# Patient Record
Sex: MALE | Race: WHITE | Employment: UNEMPLOYED | URBAN - METROPOLITAN AREA
[De-identification: names, ages, dates, MRNs, and addresses within clinical notes are randomized per-mention and may not be internally consistent; named-entity substitution may affect disease eponyms.]

---

## 2018-01-10 ENCOUNTER — OFFICE VISIT (OUTPATIENT)
Dept: NEUROLOGY | Age: 23
End: 2018-01-10

## 2018-01-10 VITALS
RESPIRATION RATE: 16 BRPM | OXYGEN SATURATION: 98 % | TEMPERATURE: 97.7 F | HEIGHT: 64 IN | BODY MASS INDEX: 29.16 KG/M2 | WEIGHT: 170.8 LBS | DIASTOLIC BLOOD PRESSURE: 78 MMHG | SYSTOLIC BLOOD PRESSURE: 100 MMHG | HEART RATE: 113 BPM

## 2018-01-10 DIAGNOSIS — R25.9 ABNORMAL INVOLUNTARY MOVEMENT: Primary | ICD-10-CM

## 2018-01-10 RX ORDER — DIPHENHYDRAMINE HCL 25 MG
50 CAPSULE ORAL 2 TIMES DAILY
COMMUNITY
End: 2022-04-19

## 2018-01-10 RX ORDER — MAGNESIUM 200 MG
1000 TABLET ORAL DAILY
COMMUNITY
End: 2020-06-20

## 2018-01-10 RX ORDER — CYCLOBENZAPRINE HCL 10 MG
TABLET ORAL
COMMUNITY
End: 2022-03-16

## 2018-01-10 RX ORDER — PEDIATRIC MULTIVITAMIN NO.17
1 TABLET,CHEWABLE ORAL DAILY
COMMUNITY
End: 2021-04-13

## 2018-01-10 NOTE — PROGRESS NOTES
Chief Complaint   Patient presents with   [address] [place] [address]     NP: Movement Disorder. 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 Patient has history of autism diagnosed 18 years ago. Patient placed in room 6. Chart and medications reviewed with patient.

## 2018-01-10 NOTE — MR AVS SNAPSHOT
Visit Information Date & Time Provider Department Dept. Phone Encounter #  
 1/10/2018  1:45 PM Pavel Joshi MD [address] [place] [address] [phone] [id number] Follow-up Instructions Return if symptoms worsen or fail to improve, for we will call with blood result. Allergies as of 1/10/2018  Never Reviewed No Known Allergies Current Immunizations  Never Reviewed No immunizations on file. Not reviewed this visit You Were Diagnosed With   
  
 Codes Comments Abnormal involuntary movement    -  Primary ICD-10-CM: R25.9 ICD-9-CM: 083. 0 Vitals BP Pulse Temp Resp Height(growth percentile) Weight(growth percentile) 100/78 (!) 113 97.7 °F (36.5 °C) (Temporal) 16 5' 4\" (1.626 m) 170 lb 12.8 oz (77.5 kg) SpO2 BMI Smoking Status 98% 29.32 kg/m2 Never Smoker BMI and BSA Data Body Mass Index Body Surface Area  
 29.32 kg/m 2 1.87 m 2 Your Updated Medication List  
  
   
This list is accurate as of: 1/10/18  1:56 PM.  Always use your most recent med list.  
  
  
  
  
 BENADRYL 25 mg capsule Generic drug:  diphenhydrAMINE Take 25 mg by mouth every six (6) hours as needed. cyclobenzaprine 10 mg tablet Commonly known as:  FLEXERIL Take  by mouth three (3) times daily as needed for Muscle Spasm(s). pediatric multivitamins chewable tablet Take 1 Tab by mouth daily. VITAMIN B-12 1,000 mcg sublingual tablet Generic drug:  cyanocobalamin Take 1,000 mcg by mouth daily. Follow-up Instructions Return if symptoms worsen or fail to improve, for we will call with blood result. To-Do List   
 01/10/2018 Lab:  CERULOPLASMIN   
  
 01/10/2018 Lab:  [facility]! Ruslan Garrison introduces [facility] patient portal. Now you can access parts of your medical record, email your doctor's office, and request medication refills online. 1. In your internet browser, go to [email or web address] 2. Click on the First Time User? Click Here link in the Sign In box. You will see the New Member Sign Up page. 3. Enter your [facility] Access Code exactly as it appears below. You will not need to use this code after youve completed the sign-up process. If you do not sign up before the expiration date, you must request a new code. · [employer] [facility] Access Code: [email or web address] Expires: 4/10/2018 12:31 PM 
 
4. Enter the last four digits of your Social Security Number (xxxx) and Date of Birth (mm/dd/yyyy) as indicated and click Submit. You will be taken to the next sign-up page. 5. Create a [employer] ID. This will be your [facility] login ID and cannot be changed, so think of one that is secure and easy to remember. 6. Create a [facility] password. You can change your password at any time. 7. Enter your Password Reset Question and Answer. This can be used at a later time if you forget your password. 8. Enter your e-mail address. You will receive e-mail notification when new information is available in [address] 9. Click Sign Up. You can now view and download portions of your medical record. 10. Click the Download Summary menu link to download a portable copy of your medical information. If you have questions, please visit the Frequently Asked Questions section of the [employer] website. Remember, [facility] is NOT to be used for urgent needs. For medical emergencies, dial 911. Now available from your iPhone and Android! Please provide this summary of care documentation to your next provider. If you have any questions after today's visit, please call [phone].

## 2018-01-21 LAB
CERULOPLASMIN SERPL-MCNC: 21.9 MG/DL (ref 16–31)
COPPER SERPL-MCNC: 84 UG/DL (ref 72–166)

## 2018-01-28 NOTE — PROGRESS NOTES
[facility]  [address], [address], [place], [address]  [address] [place], [address]  Office:  [phone]  Fax: [phone]    Referring: Self    Chief Complaint   Patient presents with   [facility] Establish Care     NP: Movement Disorder. 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 Patient has history of autism diagnosed 18 years ago. 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 He tells me that he has trouble with his autism. He notes that is variable. He says that he enjoys exercising and music. At present is not working. He does help about the house. 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 He apparently was seen by a previous neurologist who was amenable to sending the blood tests for the antibodies however the insurance company had some issue. He has not had any rapid decline in his cognition. He has not had any psychosis. There is not been any significant major change that would seem to indicate that he has any acute process ongoing. 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 No focal deficits. No recent fever chills. No loss or alteration in consciousness. He does have some tics. Past Medical History:   Diagnosis Date    Autism     dx 18 years ago    Scoliosis        Past Surgical History:   Procedure Laterality Date    HX ORTHOPAEDIC      eri in spine, spinal fusion    HX UROLOGICAL      kidney reflux surgery       Current Outpatient Prescriptions   Medication Sig Dispense Refill    diphenhydrAMINE (BENADRYL) 25 mg capsule Take 25 mg by mouth every six (6) hours as needed.       cyclobenzaprine (FLEXERIL) 10 mg tablet Take  by mouth three (3) times daily as needed for Muscle Spasm(s).  pediatric multivitamins chewable tablet Take 1 Tab by mouth daily.  cyanocobalamin (VITAMIN B-12) 1,000 mcg sublingual tablet Take 1,000 mcg by mouth daily. No Known Allergies    Social History   Substance Use Topics    Smoking status: Never Smoker    Smokeless tobacco: Never Used    Alcohol use No       No family history on file. Review of Systems:  Pertinent positives and negatives as noted otherwise comprehensive review is negative. Physical Examination:    Visit Vitals    /78    Pulse (!) 113    Temp 97.7 °F (36.5 °C) (Temporal)    Resp 16    Ht 5' 4\" (1.626 m)    Wt 77.5 kg (170 lb 12.8 oz)    SpO2 98%    BMI 29.32 kg/m2     General:  Well defined, nourished, and groomed individual in no acute distress. Neck: Supple, nontender, no bruits, no pain with resistance to active range of motion. Heart: Regular rate and rhythm, no murmurs, rub, or gallop. Normal S1S2. Lungs:  Clear to auscultation bilaterally with equal chest expansion, no cough, no wheeze  Musculoskeletal:  Extremities revealed no edema and had full range of motion of joints. Neurological Examination:     Mental Status:   He is awake and alert. He answers orientation questions. He is a bit slow to respond. He follows all commands. Cranial Nerves:    II, III, IV, VI:  Visual acuity grossly intact. Visual fields are normal.    Pupils are equal, round, and reactive to light and accommodation. Extra-ocular movements are full and fluid. Fundoscopic exam was benign, no ptosis or nystagmus. V-XII: Hearing is grossly intact. Facial features are symmetric. The palate rises symmetrically and the tongue protrudes midline. Sternocleidomastoids 5/5. Motor Examination: Normal tone, bulk, and strength, 5/5 muscle strength throughout. No cogwheel rigidity or clonus present. Sensory exam:  Normal throughout to pinprick, temperature, and vibration sense.   Normal proprioception. Coordination:  Finger to nose was normal.   No resting or intention tremor    Gait and Station:  Steady gait. Normal arm swing. No Rhomberg or pronator drift. No muscle wasting or fasiculations noted. Reflexes:  DTRs 2+ throughout. Toes downgoing. Impression/Plan  Young man with long-standing diagnosis of autism and question of NMDA receptor encephalitis raised by his mother. We had a long discussion regarding this. We discussed the fact that his history really is not suggestive of NMDA encephalitis we discussed what that is. We discussed his tics. We discussed that if we were going to do an antibody test and we should do it in the spinal fluid but I certainly would not put him through a lumbar puncture for something I do not think he has and after our discussion today she is in agreement. We did note that he has not had any copper ceruloplasmin testing. We will do that because of the tics. It is make sure that that is fine. This is just simply being complete. Discussed the rationale behind and she is in agreement. She did have some records today which are reviewed and those are helpful. Follow-up as needed and certainly if the copper and ceruloplasmin more abnormal we will contact her. Signed By: Lori Chavez MD          This note was created using voice recognition software. Despite editing, there may be syntax errors. This note will not be viewable in [address]

## 2018-01-29 ENCOUNTER — TELEPHONE (OUTPATIENT)
Dept: NEUROLOGY | Age: 23
End: 2018-01-29

## 2018-01-29 NOTE — TELEPHONE ENCOUNTER
----- Message from Inés Williamson MD sent at 1/28/2018 12:46 PM EST -----  pls let mother know copper and ceruloplasmin blood tests were nml    Thanks

## 2018-02-05 ENCOUNTER — DOCUMENTATION ONLY (OUTPATIENT)
Dept: NEUROLOGY | Age: 23
End: 2018-02-05

## 2018-02-05 NOTE — PROGRESS NOTES
Spoke to patient's mother and informed her that Dr Elle Khoury does not feel that the testing is appropriate. Patient's mother verbalized understanding of the information given.

## 2018-02-05 NOTE — PROGRESS NOTES
Patient's mother called stating that her son is \"getting worse\". She states that all of the questions that patient/mother was answering \"no\" to at the appt, she would answer \"yes\" to now. Patient's mother states that she would like Dr Prema Vargas to reconsider testing patient for Anti-NMDA Receptor Encephalitis Syndrome. Patient's mother states that she needs to know that Dr Prema Vargas will order the test for the syndrome so that she can go back to [facility] to get another referral for patient to be seen here. I explained to patient's mother that Dr Prema Vargas didn't think that was an appropriate test to order at the previous visit and most likely will not order the test but that I will send the message. Please advise. Chart routed to Dr Prema Vargas for review. Patient's mother can be reached at [phone].

## 2022-03-16 PROBLEM — F84.0 AUTISM: Status: ACTIVE | Noted: 2022-03-16

## 2022-03-16 PROBLEM — R46.89 AGGRESSIVE BEHAVIOR OF ADULT: Status: ACTIVE | Noted: 2022-03-16

## 2022-11-16 ENCOUNTER — NEW PATIENT (OUTPATIENT)
Dept: URBAN - METROPOLITAN AREA CLINIC 1 | Facility: CLINIC | Age: 27
End: 2022-11-16

## 2022-11-16 DIAGNOSIS — H16.143: ICD-10-CM

## 2022-11-16 DIAGNOSIS — H04.123: ICD-10-CM

## 2022-11-16 PROCEDURE — 92004 COMPRE OPH EXAM NEW PT 1/>: CPT

## 2022-11-16 ASSESSMENT — VISUAL ACUITY
OD_SC: 20/20-1
OS_SC: J1+
OD_SC: J1+
OU_SC: J1+
OS_SC: 20/20

## 2022-11-16 ASSESSMENT — TONOMETRY
OS_IOP_MMHG: 17
OD_IOP_MMHG: 13

## 2024-05-15 ENCOUNTER — COMPREHENSIVE EXAM (OUTPATIENT)
Facility: LOCATION | Age: 29
End: 2024-05-15

## 2024-05-15 DIAGNOSIS — H52.13: ICD-10-CM

## 2024-05-15 DIAGNOSIS — H04.123: ICD-10-CM

## 2024-05-15 DIAGNOSIS — H16.143: ICD-10-CM

## 2024-05-15 PROCEDURE — 99214 OFFICE O/P EST MOD 30 MIN: CPT

## 2024-05-15 PROCEDURE — 92015 DETERMINE REFRACTIVE STATE: CPT

## 2024-05-15 ASSESSMENT — TONOMETRY
OD_IOP_MMHG: 15
OS_IOP_MMHG: 15

## 2024-05-15 ASSESSMENT — KERATOMETRY
OD_AXISANGLE_DEGREES: 2
OS_AXISANGLE_DEGREES: 169
OS_K1POWER_DIOPTERS: 43.00
OS_K2POWER_DIOPTERS: 44.00
OS_AXISANGLE2_DEGREES: 79
OD_AXISANGLE2_DEGREES: 92
OD_K1POWER_DIOPTERS: 43.00
OD_K2POWER_DIOPTERS: 44.00

## 2024-05-15 ASSESSMENT — VISUAL ACUITY
OS_SC: 20/20
OD_SC: 20/20
OS_SC: 20/20
OU_SC: 20/20
OD_SC: 20/20
OU_SC: 20/20

## 2025-01-24 ENCOUNTER — EMERGENCY VISIT (OUTPATIENT)
Age: 30
End: 2025-01-24

## 2025-01-24 DIAGNOSIS — H51.11: ICD-10-CM

## 2025-01-24 PROCEDURE — 99213 OFFICE O/P EST LOW 20 MIN: CPT

## 2025-03-26 ENCOUNTER — COMPREHENSIVE EXAM (OUTPATIENT)
Age: 30
End: 2025-03-26

## 2025-03-26 DIAGNOSIS — Z01.00: ICD-10-CM

## 2025-03-26 DIAGNOSIS — H52.13: ICD-10-CM

## 2025-03-26 PROCEDURE — 92015 DETERMINE REFRACTIVE STATE: CPT

## 2025-03-26 PROCEDURE — 92014 COMPRE OPH EXAM EST PT 1/>: CPT
